# Patient Record
Sex: FEMALE | Race: BLACK OR AFRICAN AMERICAN | NOT HISPANIC OR LATINO | Employment: STUDENT | URBAN - METROPOLITAN AREA
[De-identification: names, ages, dates, MRNs, and addresses within clinical notes are randomized per-mention and may not be internally consistent; named-entity substitution may affect disease eponyms.]

---
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
## 2025-02-27 ENCOUNTER — OFFICE VISIT (OUTPATIENT)
Dept: PRIMARY CARE CLINIC | Facility: CLINIC | Age: 20
End: 2025-02-27
Payer: MEDICAID

## 2025-02-27 ENCOUNTER — PATIENT OUTREACH (OUTPATIENT)
Dept: ADMINISTRATIVE | Facility: OTHER | Age: 20
End: 2025-02-27
Payer: MEDICAID

## 2025-02-27 VITALS
SYSTOLIC BLOOD PRESSURE: 139 MMHG | WEIGHT: 161.5 LBS | TEMPERATURE: 99 F | DIASTOLIC BLOOD PRESSURE: 74 MMHG | BODY MASS INDEX: 31.71 KG/M2 | HEART RATE: 85 BPM | OXYGEN SATURATION: 98 % | HEIGHT: 60 IN

## 2025-02-27 DIAGNOSIS — K52.9 GASTROENTERITIS: Primary | ICD-10-CM

## 2025-02-27 PROCEDURE — 99999 PR PBB SHADOW E&M-EST. PATIENT-LVL III: CPT | Mod: PBBFAC,,,

## 2025-02-27 PROCEDURE — 99213 OFFICE O/P EST LOW 20 MIN: CPT | Mod: PBBFAC,PN

## 2025-02-27 RX ORDER — DICYCLOMINE HYDROCHLORIDE 10 MG/1
10 CAPSULE ORAL 3 TIMES DAILY PRN
Qty: 30 CAPSULE | Refills: 0 | Status: SHIPPED | OUTPATIENT
Start: 2025-02-27 | End: 2025-03-09

## 2025-02-27 RX ORDER — DICYCLOMINE HYDROCHLORIDE 10 MG/1
10 CAPSULE ORAL 3 TIMES DAILY PRN
Qty: 30 CAPSULE | Refills: 0 | Status: SHIPPED | OUTPATIENT
Start: 2025-02-27 | End: 2025-02-27

## 2025-02-27 NOTE — LETTER
February 27, 2025      [facility]  [address] [place] [id number]       Patient: Ellyn Whitten   YOB: 2005  Date of Visit: 02/27/2025    To Whom It May Concern:    Yon Whitten  was at [facility] on 02/27/2025. The patient may return to work/school on 3/1/2025 with no restrictions. If you have any questions or concerns, or if I can be of further assistance, please do not hesitate to contact me.    Sincerely,    Sincere Chicas MD

## 2025-02-27 NOTE — PROGRESS NOTES
CHW - Initial Contact    This Community Health Worker completed the Social Determinant of Health questionnaire with patient during clinic visit today.    Pt identified barriers of most importance are: NONE   Referrals to community agencies completed with patient/caregiver consent outside of [facility] include: NONE  Referrals were put through [facility] - : NO  Support and Services: No support & services have been documented.  Other information discussed the patient needs / wants help with: NONE   Follow up required: NO  No future outreach task assigned

## 2025-02-28 NOTE — PROGRESS NOTES
Subjective:       Patient ID: Ellyn Whitten is a 19 y.o. female.    Chief Complaint: Patient presents today with abdominal pain, nausea, vomiting, and increased bowel movements.      Ellyn Whitten is a 19 y.o. year old female  who comes to clinic for diarrhea      HISTORY OF PRESENT ILLNESS:  She reports abdominal pain for 5 days, rated 6/10 in severity, not localized, described as cramping.  The pain location varies and she states is exacerbated by stress. She has had increased bowel movements. She has experienced nausea with two episodes of vomiting while using the bathroom. She denies watery stools or fever. Symptoms began one day after consuming [employer] 5 days ago. She took ibuprofen once without relief.      ROS negative except as above  Objective:      /74   Pulse 85   Temp 98.5 °F (36.9 °C) (Oral)   Ht 5' (1.524 m)   Wt 73.2 kg (161 lb 7.8 oz)   LMP  (LMP Unknown)   SpO2 98%   BMI 31.54 kg/m²    Physical Exam  Vitals reviewed.   Constitutional:       Appearance: Normal appearance.   HENT:      Head: Normocephalic.      Mouth/Throat:      Mouth: Mucous membranes are dry.   Cardiovascular:      Rate and Rhythm: Normal rate and regular rhythm.      Pulses: Normal pulses.      Heart sounds: Normal heart sounds.   Pulmonary:      Effort: Pulmonary effort is normal.      Breath sounds: Normal breath sounds. No wheezing or rhonchi.   Abdominal:      General: Abdomen is flat. There is no distension.      Palpations: There is no mass.      Tenderness: There is no abdominal tenderness. There is no guarding or rebound.      Hernia: No hernia is present.   Musculoskeletal:         General: No swelling. Normal range of motion.      Cervical back: Normal range of motion.   Skin:     General: Skin is warm.      Coloration: Skin is not jaundiced.   Neurological:      Mental Status: She is alert.         Assessment:       1. Gastroenteritis          Plan:       1. Gastroenteritis (Primary)  -VSS,  nontoxic-appearing, appears to be improving. Likely 2/2 viral etiology. Exam unremarkable.  -Reassurance provided and advised supportive care and that does not need antibiotics at this time as this condition is self-limited and should resolve in the next few days.  - Will do stool testing to evaluate for other possible causes.  -ER precautions provided to patient, especially if Sx appears to be worse and patient needs immediate medical evaluation.  Counseled patient on adequate hydration, her oral mucosas are dry today but she is able to tolerate p.o.  We will prescribe a short course of Bentyl for abdominal pain.  - H. pylori antigen, stool; Future  - Occult blood x 1, stool; Future  - WBC, Stool; Future  - Giardia / Cryptosporidum, EIA; Future  - Stool Exam-Ova,Cysts,Parasites; Future  - Clostridium difficile EIA; Future  - Stool culture; Future  - CBC Auto Differential; Future  - dicyclomine (BENTYL) 10 MG capsule; Take 1 capsule (10 mg total) by mouth 3 (three) times daily as needed.  Dispense: 30 capsule; Refill: 0        Follow up in about 6 months (around 8/27/2025) for annual wellness visit.      Sincere Chicas M.D.    This note was generated with the assistance of ambient listening technology. Verbal consent was obtained by the patient and accompanying visitor(s) for the recording of patient appointment to facilitate this note. I attest to having reviewed and edited the generated note for accuracy, though some syntax or spelling errors may persist. Please contact the author of this note for any clarification.